# Patient Record
Sex: MALE | Race: WHITE | NOT HISPANIC OR LATINO | URBAN - METROPOLITAN AREA
[De-identification: names, ages, dates, MRNs, and addresses within clinical notes are randomized per-mention and may not be internally consistent; named-entity substitution may affect disease eponyms.]

---

## 2017-07-22 ENCOUNTER — EMERGENCY (EMERGENCY)
Facility: HOSPITAL | Age: 22
LOS: 1 days | Discharge: HOME | End: 2017-07-22
Attending: EMERGENCY MEDICINE
Payer: COMMERCIAL

## 2017-07-22 VITALS
HEART RATE: 81 BPM | HEIGHT: 66 IN | TEMPERATURE: 98 F | DIASTOLIC BLOOD PRESSURE: 80 MMHG | RESPIRATION RATE: 18 BRPM | SYSTOLIC BLOOD PRESSURE: 133 MMHG | WEIGHT: 175.05 LBS | OXYGEN SATURATION: 99 %

## 2017-07-22 PROCEDURE — 99284 EMERGENCY DEPT VISIT MOD MDM: CPT | Mod: 25

## 2017-07-22 PROCEDURE — 96374 THER/PROPH/DIAG INJ IV PUSH: CPT

## 2017-07-22 PROCEDURE — 99053 MED SERV 10PM-8AM 24 HR FAC: CPT

## 2017-07-22 RX ORDER — CEFAZOLIN SODIUM 1 G
1000 VIAL (EA) INJECTION ONCE
Qty: 0 | Refills: 0 | Status: COMPLETED | OUTPATIENT
Start: 2017-07-22 | End: 2017-07-22

## 2017-07-22 RX ADMIN — Medication 100 MILLIGRAM(S): at 03:47

## 2017-07-22 NOTE — ED PROVIDER NOTE - MEDICAL DECISION MAKING DETAILS
22 yr old F presented to ED with left hand swelling and redness s/p injury with . Pt currently on Keflex from his PCP. Pt left hand slightly swollen and redness. Pt is afebrile and cellulitis is limited to dorsum of left hand and not extending to finger or wrist. Pt treated with IV Ancef and D/C in stable condition. Continue with Keflex by PCP and F/U with PCP.

## 2017-07-22 NOTE — ED PROVIDER NOTE - CARE PLAN
Principal Discharge DX:	Cellulitis of left upper extremity Principal Discharge DX:	Cellulitis of left upper extremity  Instructions for follow-up, activity and diet:	Continue with current Medication. Return to ED if  onset of Fever, redness, swelling increases.

## 2017-07-22 NOTE — ED ADULT NURSE NOTE - OBJECTIVE STATEMENT
patient states that he had stitches 2 days ago after cutting his hand on glass at work, he has redness and slight swelling to area, as well as minor pain. patient has some clear drainage coming from site, able to move extremity

## 2017-07-22 NOTE — ED PROVIDER NOTE - OBJECTIVE STATEMENT
22 yr old F presented to ED with left hand swelling and redness . Pt explained that he was at work 2 days ago when he accidentally cut his hand with a  . Pt explained that he was seen and laceration was sutured. Pt also states that he was placed on a low dose Amoxicillin. Pt explained that he noticed redness to the area of his hand and he went to his PCP who placed him on Keflex. Pt states that he was told that if his injury gets worst to go the ED. Pt sates that he came in due to the redness.

## 2017-07-22 NOTE — ED PROVIDER NOTE - ATTENDING CONTRIBUTION TO CARE
23 yo M with laceration to L hand with boxcutter at work 2 days priro and was sutured and placed on low dose of Augmentin and then switched to Keflex by PMD.  Pt with increased swelling and redness to area and came to ED.  On exam pt with no d/c, fluctuance or obvious abscess.  Rx 1st dose of IV Ancef and pt to continue po Abx with wound care instructions

## 2017-07-22 NOTE — ED ADULT TRIAGE NOTE - CHIEF COMPLAINT QUOTE
Pt "cut hand with  at work two days ago and was told if swelling gets worse, come to ER", pt taking keflex

## 2017-07-22 NOTE — ED PROVIDER NOTE - PROGRESS NOTE DETAILS
Pt treated with 1 gram Ancef IV x 1 and pt tolerated well. Pt had to reaction with Medication and D/C in stable condition and F/U with PCP.

## 2018-11-20 NOTE — ED PROVIDER NOTE - CAPILLARY REFILL
What Is The Reason For Today's Visit?: Full Body Skin Examination
What Is The Reason For Today's Visit? (Being Monitored For X): concerning skin lesions on an annual basis
How Severe Are Your Spot(S)?: moderate
less than 2 seconds

## 2022-04-26 NOTE — ED PROVIDER NOTE - PRINCIPAL DIAGNOSIS
Cellulitis of left upper extremity Staged Advancement Flap Text: The defect edges were debeveled with a #15 scalpel blade.  Given the location of the defect, shape of the defect and the proximity to free margins a staged advancement flap was deemed most appropriate.  Using a sterile surgical marker, an appropriate advancement flap was drawn incorporating the defect and placing the expected incisions within the relaxed skin tension lines where possible. The area thus outlined was incised deep to adipose tissue with a #15 scalpel blade.  The skin margins were undermined to an appropriate distance in all directions utilizing iris scissors.